# Patient Record
(demographics unavailable — no encounter records)

---

## 2024-10-08 NOTE — HISTORY OF PRESENT ILLNESS
[Patient reported mammogram was normal] : Patient reported mammogram was normal [Patient reported PAP Smear was normal] : Patient reported PAP Smear was normal [Patient reported colonoscopy was normal] : Patient reported colonoscopy was normal [Y] : Positive pregnancy history [TextBox_4] : GYNHX No history of fibroids, cysts, or STDs  9/reg/on ocp now lasp ap 1/24 wnl [Mammogramdate] : 2022 [PapSmeardate] : 2024 [ColonoscopyDate] : 2019  [PGHxTotal] : 3 [[facility] [PGHxABSpont] : 1 [FreeTextEntry1] : 2

## 2024-10-08 NOTE — DISCUSSION/SUMMARY
[FreeTextEntry1] : 35 yo p2012 contraceptive ocp refill  LO -LOESTRIN 1/10 FOR 6 MO RTO  for annual in 5-6 mo

## 2025-03-05 NOTE — PROCEDURE
[Cervical Pap Smear] : cervical Pap smear [Liquid Base] : liquid base [Tolerated Well] : the patient tolerated the procedure well [No Complications] : there were no complications [Transvaginal Ultrasound] : transvaginal ultrasound [FreeTextEntry9] : Bloating [FreeTextEntry4] : Normal uterus and ovaries

## 2025-03-05 NOTE — PHYSICAL EXAM
[Chaperone Declined] : Patient declined chaperone [Appropriately responsive] : appropriately responsive [Alert] : alert [No Acute Distress] : no acute distress [No Lymphadenopathy] : no lymphadenopathy [Soft] : soft [Non-tender] : non-tender [Non-distended] : non-distended [No HSM] : No HSM [No Lesions] : no lesions [No Mass] : no mass [Oriented x3] : oriented x3 [Examination Of The Breasts] : a normal appearance [No Discharge] : no discharge [No Masses] : no breast masses were palpable [Labia Majora] : normal [Labia Minora] : normal [Normal] : normal [Uterine Adnexae] : normal

## 2025-03-05 NOTE — HISTORY OF PRESENT ILLNESS
[Patient reported PAP Smear was normal] : Patient reported PAP Smear was normal [Y] : Positive pregnancy history [TextBox_4] : GYNHX No history of fibroids, cysts, or STDs Former patient of Dr. Mcgovern [PapSmeardate] : 2-2024 [PGHxTotal] : 3 [[facility] [PGHxABSpont] : 1 [FreeTextEntry1] : 2

## 2025-03-05 NOTE — HISTORY OF PRESENT ILLNESS
[Patient reported PAP Smear was normal] : Patient reported PAP Smear was normal [Y] : Positive pregnancy history [TextBox_4] : GYNHX No history of fibroids, cysts, or STDs Former patient of Dr. Mcgovern [PapSmeardate] : 2-2024 [PGHxTotal] : 3 [[facility] [PGHxABSpont] : 1 [FreeTextEntry1] : 2

## 2025-03-05 NOTE — DISCUSSION/SUMMARY
[FreeTextEntry1] : 37-year-old para 2-0-1-2 annual GYN, OCP refill, bloating, premenopausal symptoms Pap HPV Pelvic sono- wnl Continue OCP or stop as desired